# Patient Record
(demographics unavailable — no encounter records)

---

## 2025-01-21 NOTE — PLAN
[FreeTextEntry1] : Referral Orthopedic specialist for evaluation Oncology/Hem/Onc referral att.    Follow up with [facility] center as needed.

## 2025-01-21 NOTE — PHYSICAL EXAM
[Normal] : no jugular venous distention, supple, no lymphadenopathy and the thyroid was normal and there were no nodules present [Grossly Normal Strength/Tone] : grossly normal strength/tone [de-identified] : + hard fixed lump to anterior left axilla noted  [de-identified] : + mild swelling Pectoralis major near axilla region

## 2025-01-21 NOTE — REVIEW OF SYSTEMS
[Muscle Pain] : muscle pain [Negative] : Heme/Lymph [FreeTextEntry9] : L upper extremity- axillary area- radiates down arm at times  [de-identified] : + redness, + warmth, + lump to left armpit (front)

## 2025-01-21 NOTE — HISTORY OF PRESENT ILLNESS
[FreeTextEntry8] : Pt presents with c/o left axillary area redness, tenderness and swelling "on and off" for a few months.  Hx of axillary muscle tear in past to that exact area that has resolved.  States lifting weights/working out lately may have aggravated area.  Denies recent trauma, fever/chills.

## 2025-01-23 NOTE — DISCUSSION/SUMMARY
[de-identified] : We had a thorough discussion regarding the nature of his pain, the pathophysiology, as well as all treatment options. I discussed operative and non-operative treatment modalities. Recommend patient follow up with a dermatologist for the irritation to his skin. Considering the patient's current presentation of pain, physical exam, and radiographs an MRI of the chest is indicated at this time. A prescription for this was given to the patient. We will go over the MRI results with him upon obtaining the results in the office and advise him of further treatment options. He agrees with the above plan and all questions were answered.

## 2025-01-23 NOTE — PHYSICAL EXAM
[de-identified] : General: Well appearing, no acute distress Neurologic: A&Ox3, No focal deficits Head: NCAT without abrasions, lacerations, or ecchymosis to head, face, or scalp Eyes: No scleral icterus, no gross abnormalities Respiratory: Equal chest wall expansion bilaterally, no accessory muscle use Lymphatic: No lymphadenopathy palpated Skin: Warm and dry Psychiatric: Normal mood and affect  Examination of the left shoulder/left pectoralis reveals axillary cording as well as pinching to the distal pectoralis. Irritation to the skin. 3 cm diameter erythematous spot that is superficial and mobile.  There is no evidence of muscle atrophy.  The skin is intact.  There is no swelling, erythema, or ecchymosis.  The patient is nontender to palpation throughout the left shoulder including the anterior aspect of the shoulder near the bicipital groove, a.c. joint, trapezius and posterior shoulder.  The patient's active range of motion is as follows: Forward flexion to 175 degrees external rotation to 75 degrees and internal rotation to an upper lumbar level.  The patient has 5 out of 5 strength to forward flexion with pronation, internal and external rotation.  The patient has negative Del Cid and Neer's tests.  There is no pain with cross body abduction testing.  There is a negative a.c. compression test and no sulcus sign present.  There is a negative liftoff test and negative yergason test.  The compartments of the arm are soft and nontender without evidence of DVT or compartment syndrome.  The patient is grossly neurovascularly intact distally. [de-identified] : The following radiographs were ordered and read by me during this patient's visit. I reviewed each radiograph in detail with the patient and discussed the findings as highlighted below. 4 views of the left shoulder were obtained today that show no fracture, dislocation. There is no degenerative change seen. There is no malalignment. No obvious osseous abnormality. Otherwise unremarkable.

## 2025-01-23 NOTE — HISTORY OF PRESENT ILLNESS
[de-identified] : CONNIE is a 42 year male here today for initial visit due to L pectoralis. He presents today reporting a L pectoralis muscle tear. He reports this has been ongoing for about one year. Lately, he states that he has had some pinching to the L pectoralis. He also reports that he has some irritation to the skin in the region and it is causing discomfort. he presents today for evaluation of his symptoms as well as discussion of treatment options. He follows with a dermatologist however he states that he has not presented for this. He has history of psoriasis and has recently stated Otezla.

## 2025-01-23 NOTE — HISTORY OF PRESENT ILLNESS
[de-identified] : CONNIE is a 42 year male here today for initial visit due to L pectoralis. He presents today reporting a L pectoralis muscle tear. He reports this has been ongoing for about one year. Lately, he states that he has had some pinching to the L pectoralis. He also reports that he has some irritation to the skin in the region and it is causing discomfort. he presents today for evaluation of his symptoms as well as discussion of treatment options. He follows with a dermatologist however he states that he has not presented for this. He has history of psoriasis and has recently stated Otezla.

## 2025-01-23 NOTE — ADDENDUM
[FreeTextEntry1] : Documented by Eliane Lynn acting as a scribe for Dr. Tai and Rigo Cardona PA-C on 01/23/2025. All medical record entries made by the Scribe were at my, Dr. Tai, and Rigo Cardona's, direction and personally dictated by me on 01/23/2025. I have reviewed the chart and agree that the record accurately reflects my personal performance of the history, physical exam, procedure and imaging.

## 2025-01-23 NOTE — DISCUSSION/SUMMARY
[de-identified] : We had a thorough discussion regarding the nature of his pain, the pathophysiology, as well as all treatment options. I discussed operative and non-operative treatment modalities. Recommend patient follow up with a dermatologist for the irritation to his skin. Considering the patient's current presentation of pain, physical exam, and radiographs an MRI of the chest is indicated at this time. A prescription for this was given to the patient. We will go over the MRI results with him upon obtaining the results in the office and advise him of further treatment options. He agrees with the above plan and all questions were answered.

## 2025-01-23 NOTE — CONSULT LETTER
[Dear  ___] : Dear  [unfilled], [Consult Letter:] : I had the pleasure of evaluating your patient, [unfilled]. [Please see my note below.] : Please see my note below. [Sincerely,] : Sincerely, [FreeTextEntry3] : Dr. Saad Tai

## 2025-01-23 NOTE — PHYSICAL EXAM
[de-identified] : General: Well appearing, no acute distress Neurologic: A&Ox3, No focal deficits Head: NCAT without abrasions, lacerations, or ecchymosis to head, face, or scalp Eyes: No scleral icterus, no gross abnormalities Respiratory: Equal chest wall expansion bilaterally, no accessory muscle use Lymphatic: No lymphadenopathy palpated Skin: Warm and dry Psychiatric: Normal mood and affect  Examination of the left shoulder/left pectoralis reveals axillary cording as well as pinching to the distal pectoralis. Irritation to the skin. 3 cm diameter erythematous spot that is superficial and mobile.  There is no evidence of muscle atrophy.  The skin is intact.  There is no swelling, erythema, or ecchymosis.  The patient is nontender to palpation throughout the left shoulder including the anterior aspect of the shoulder near the bicipital groove, a.c. joint, trapezius and posterior shoulder.  The patient's active range of motion is as follows: Forward flexion to 175 degrees external rotation to 75 degrees and internal rotation to an upper lumbar level.  The patient has 5 out of 5 strength to forward flexion with pronation, internal and external rotation.  The patient has negative Del Cid and Neer's tests.  There is no pain with cross body abduction testing.  There is a negative a.c. compression test and no sulcus sign present.  There is a negative liftoff test and negative yergason test.  The compartments of the arm are soft and nontender without evidence of DVT or compartment syndrome.  The patient is grossly neurovascularly intact distally. [de-identified] : The following radiographs were ordered and read by me during this patient's visit. I reviewed each radiograph in detail with the patient and discussed the findings as highlighted below. 4 views of the left shoulder were obtained today that show no fracture, dislocation. There is no degenerative change seen. There is no malalignment. No obvious osseous abnormality. Otherwise unremarkable.